# Patient Record
Sex: FEMALE | Race: WHITE | NOT HISPANIC OR LATINO | Employment: FULL TIME | URBAN - METROPOLITAN AREA
[De-identification: names, ages, dates, MRNs, and addresses within clinical notes are randomized per-mention and may not be internally consistent; named-entity substitution may affect disease eponyms.]

---

## 2020-03-02 ENCOUNTER — OFFICE VISIT (OUTPATIENT)
Dept: URGENT CARE | Facility: CLINIC | Age: 40
End: 2020-03-02
Payer: COMMERCIAL

## 2020-03-02 VITALS
DIASTOLIC BLOOD PRESSURE: 80 MMHG | OXYGEN SATURATION: 98 % | SYSTOLIC BLOOD PRESSURE: 125 MMHG | BODY MASS INDEX: 31.02 KG/M2 | HEIGHT: 66 IN | HEART RATE: 82 BPM | TEMPERATURE: 99.5 F | RESPIRATION RATE: 20 BRPM | WEIGHT: 193 LBS

## 2020-03-02 DIAGNOSIS — J02.9 SORE THROAT: Primary | ICD-10-CM

## 2020-03-02 LAB — S PYO AG THROAT QL: NEGATIVE

## 2020-03-02 PROCEDURE — 87880 STREP A ASSAY W/OPTIC: CPT | Performed by: FAMILY MEDICINE

## 2020-03-02 PROCEDURE — G0382 LEV 3 HOSP TYPE B ED VISIT: HCPCS | Performed by: FAMILY MEDICINE

## 2020-03-02 RX ORDER — CETIRIZINE HYDROCHLORIDE 10 MG/1
10 TABLET, CHEWABLE ORAL DAILY
COMMUNITY

## 2020-03-02 NOTE — LETTER
March 2, 2020     Patient: Avery Carbone   YOB: 1980   Date of Visit: 3/2/2020       To Whom it May Concern:    Avery Carbone was seen in my clinic on 3/2/2020  She is excused from work due to illness 3/3/2020  If you have any questions or concerns, please don't hesitate to call           Sincerely,          Calli Odell MD        CC: No Recipients

## 2020-03-03 NOTE — PROGRESS NOTES
NAME: Shaunna Lopez is a 42 y o  female  : 1980    MRN: [id number]      Assessment and Plan   Sore throat [J02 9]  1  Sore throat  POCT rapid strepA           Patient Instructions   Patient Instructions   F/u as needed  Most likely viral   Supportive care  OTC meds as needed  F/u with PCP if no improvement  Rapid strep neg  Proceed to ER if symptoms worsen  Chief Complaint     Chief Complaint   Patient presents with    Sore Throat     patient reports on  started with a cough,sore throat and low grade temp  History of Present Illness   Here c/o sore throat, fever, cough  Started yesterday  SOB today- used albuterol  Body aches yesterday  Took advil today  Review of Systems   Review of Systems   Constitutional: Positive for fatigue and fever  HENT: Positive for sore throat  Negative for ear pain and trouble swallowing  Eyes: Negative for visual disturbance  Respiratory: Positive for cough and shortness of breath  Negative for chest tightness and wheezing  Cardiovascular: Negative for chest pain  Gastrointestinal: Positive for nausea  Negative for abdominal pain, diarrhea and vomiting  Genitourinary: Negative for difficulty urinating and dysuria  Musculoskeletal: Positive for arthralgias and myalgias  Skin: Negative for rash and wound  Neurological: Positive for weakness  Negative for headaches  Current Medications       Current Outpatient Medications:     cetirizine (ZyrTEC) 10 MG chewable tablet, Chew 10 mg daily, Disp: , Rfl:     Current Allergies     Allergies as of 2020    (No Known Allergies)              Past Medical History:   Diagnosis Date    Allergic     Asthma        History reviewed  No pertinent surgical history  History reviewed  No pertinent family history  Medications have been verified      The following portions of the patient's history were reviewed and updated as appropriate: allergies, current medications, past family history, past medical history, past social history, past surgical history and problem list     Objective   /80   Pulse 82   Temp 99 5 °F (37 5 °C)   Resp 20   Ht 5' 6" (1 676 m)   Wt 87 5 kg (193 lb)   LMP 02/17/2020   SpO2 98%   BMI 31 15 kg/m²      Physical Exam     Physical Exam   Constitutional: She is oriented to person, place, and time  She appears well-developed and well-nourished  HENT:   Head: Normocephalic  Mouth/Throat: Posterior oropharyngeal erythema present  No oropharyngeal exudate or posterior oropharyngeal edema  Eyes: EOM are normal    Neck: Normal range of motion  Cardiovascular: Normal rate, regular rhythm and normal heart sounds  Exam reveals no gallop and no friction rub  No murmur heard  Pulmonary/Chest: Effort normal and breath sounds normal  No respiratory distress  She has no wheezes  She has no rales  Abdominal: Soft  Bowel sounds are normal  She exhibits no distension  There is no tenderness  There is no rebound and no guarding  Musculoskeletal: Normal range of motion  Neurological: She is oriented to person, place, and time  Skin: Skin is warm and dry  No rash noted  Psychiatric: She has a normal mood and affect  Thought content normal    Nursing note and vitals reviewed

## 2022-11-29 ENCOUNTER — OFFICE VISIT (OUTPATIENT)
Dept: URGENT CARE | Facility: CLINIC | Age: 42
End: 2022-11-29

## 2022-11-29 VITALS
SYSTOLIC BLOOD PRESSURE: 132 MMHG | HEIGHT: 67 IN | BODY MASS INDEX: 31.08 KG/M2 | WEIGHT: 198 LBS | RESPIRATION RATE: 16 BRPM | OXYGEN SATURATION: 100 % | TEMPERATURE: 98.1 F | HEART RATE: 91 BPM | DIASTOLIC BLOOD PRESSURE: 80 MMHG

## 2022-11-29 DIAGNOSIS — R52 BODY ACHES: ICD-10-CM

## 2022-11-29 DIAGNOSIS — B34.9 VIRAL SYNDROME: Primary | ICD-10-CM

## 2022-11-29 LAB
SARS-COV-2 AG UPPER RESP QL IA: NEGATIVE
VALID CONTROL: NORMAL

## 2022-11-29 RX ORDER — ALBUTEROL SULFATE 90 UG/1
2 AEROSOL, METERED RESPIRATORY (INHALATION) EVERY 6 HOURS PRN
COMMUNITY

## 2022-11-29 NOTE — LETTER
November 29, 2022     Patient: Dorie Mazariegos   YOB: 1980   Date of Visit: 11/29/2022       To Whom it May Concern:    Dorie Mazariegos was seen in my clinic on 11/29/2022  She may return to work on 12/01/2022  If you have any questions or concerns, please don't hesitate to call           Sincerely,          Rodrick Werner PA-C

## 2022-11-29 NOTE — PATIENT INSTRUCTIONS
Viral Syndrome   AMBULATORY CARE:   Viral syndrome  is a term used for symptoms of an infection caused by a virus  Viruses are spread easily from person to person through the air and on shared items  Signs and symptoms  may start slowly or suddenly and last hours to days  They can be mild to severe and can change over days or hours  You may have any of the following:  Fever and chills    A runny or stuffy nose    Cough, sore throat, or hoarseness    Headache, or pain and pressure around your eyes    Muscle aches and joint pain    Shortness of breath or wheezing    Abdominal pain, cramps, and diarrhea    Nausea, vomiting, or loss of appetite    Call your local emergency number (911 in the [address] [facility]) or have someone else call if:   You have a seizure  You cannot be woken  You have chest pain or trouble breathing  Seek care immediately if:   You have a stiff neck, a bad headache, and sensitivity to light  You feel weak, dizzy, or confused  You stop urinating or urinate a lot less than usual     You cough up blood or thick yellow or green mucus  You have severe abdominal pain or your abdomen is larger than usual     Call your doctor if:   Your symptoms do not get better with treatment or get worse after 3 days  You have a rash or ear pain  You have burning when you urinate  You have questions or concerns about your condition or care  Treatment for viral syndrome  may include medicines to manage your symptoms  Antibiotics are not given for a viral infection  You may  need any of the following:  Acetaminophen  decreases pain and fever  It is available without a doctor's order  Ask how much to take and how often to take it  Follow directions  Read the labels of all other medicines you are using to see if they also contain acetaminophen, or ask your doctor or pharmacist  Acetaminophen can cause liver damage if not taken correctly   Do not use more than 4 grams (4,000 milligrams) total of acetaminophen in one day  NSAIDs , such as ibuprofen, help decrease swelling, pain, and fever  NSAIDs can cause stomach bleeding or kidney problems in certain people  If you take blood thinner medicine, always ask your healthcare provider if NSAIDs are safe for you  Always read the medicine label and follow directions  Cold medicine  helps decrease swelling, control a cough, and relieve chest or nasal congestion  Saline nasal spray  helps decrease nasal congestion  Manage your symptoms:   Drink liquids as directed to prevent dehydration  Ask how much liquid to drink each day and which liquids are best for you  Ask if you should drink an oral rehydration solution (ORS)  An ORS has the right amounts of water, salts, and sugar you need to replace body fluids  This may help prevent dehydration caused by vomiting or diarrhea  Do not drink liquids with caffeine  Liquids with caffeine can make dehydration worse  Get plenty of rest to help your body heal   Take naps throughout the day  Ask your healthcare provider when you can return to work and your normal activities  Use a cool mist humidifier to help you breathe easier  Ask your healthcare provider how to use a cool mist humidifier  Eat honey or use cough drops for a sore throat  Cough drops are available without a doctor's order  Follow directions for taking cough drops  Do not smoke or be close to anyone who is smoking  Nicotine and other chemicals in cigarettes and cigars can cause lung damage  Smoking can also delay healing  Ask your healthcare provider for information if you currently smoke and need help to quit  E-cigarettes or smokeless tobacco still contain nicotine  Talk to your healthcare provider before you use these products  Prevent the spread of germs:       Wash your hands often  Wash your hands several times each day  Wash after you use the bathroom, change a child's diaper, and before you prepare or eat food   Use soap and water every time  Rub your soapy hands together, lacing your fingers  Wash the front and back of your hands, and in between your fingers  Use the fingers of one hand to scrub under the fingernails of the other hand  Wash for at least 20 seconds  Rinse with warm, running water for several seconds  Then dry your hands with a clean towel or paper towel  Use hand  that contains alcohol if soap and water are not available  Do not touch your eyes, nose, or mouth without washing your hands first          Cover a sneeze or cough  Use a tissue that covers your mouth and nose  Throw the tissue away in a trash can right away  Use the bend of your arm if a tissue is not available  Wash your hands well with soap and water or use a hand   Stay away from others while you are sick  Avoid crowds as much as possible  Ask about vaccines you may need  Talk to your healthcare provider about your vaccine history  He or she will tell you which vaccines you need, and when to get them  Get the influenza (flu) vaccine as soon as recommended each year  The flu vaccine is available starting in September or October  Flu viruses change, so it is important to get a flu vaccine every year  Get the pneumonia vaccine if recommended  This vaccine is usually recommended every 5 years  Your provider will tell you when to get this vaccine, if needed  Follow up with your doctor as directed:  Write down your questions so you remember to ask them during your visits  © Copyright [employer] 2022 Information is for End User's use only and may not be sold, redistributed or otherwise used for commercial purposes  All illustrations and images included in CareNotes® are the copyrighted property of [employer] M [employer] Inc  or Penny Quevedo  The above information is an  only  It is not intended as medical advice for individual conditions or treatments   Talk to your doctor, nurse or pharmacist before following any medical regimen to see if it is safe and effective for you

## 2022-11-29 NOTE — PROGRESS NOTES
[facility]        NAME: Munira Ritchie is a 38 y o  female  : 1980    MRN: [id number]  DATE: 2022  TIME: 9:32 AM    Assessment and Plan   Body aches [R52]  1  Body aches  Poct Covid 19 Rapid Antigen Test    Covid/Flu-Office Collect            Patient Instructions       Follow up with PCP in 3-5 days  Proceed to  ER if symptoms worsen  Chief Complaint     Chief Complaint   Patient presents with   • Fatigue     Patient reports onset of fatigue and body aches last night with sore throat and nasal congestion  Denies any fever  Denies testing at home for Covid  Managing symptoms with Advil last dose last night  History of Present Illness       HPI    Review of Systems   Review of Systems      Current Medications       Current Outpatient Medications:   •  albuterol (PROVENTIL HFA,VENTOLIN HFA) 90 mcg/act inhaler, Inhale 2 puffs every 6 (six) hours as needed for wheezing, Disp: , Rfl:   •  cetirizine (ZyrTEC) 10 MG chewable tablet, Chew 10 mg daily (Patient not taking: Reported on 2022), Disp: , Rfl:     Current Allergies     Allergies as of 2022   • (No Known Allergies)            The following portions of the patient's history were reviewed and updated as appropriate: allergies, current medications, past family history, past medical history, past social history, past surgical history and problem list      Past Medical History:   Diagnosis Date   • Allergic    • Asthma        No past surgical history on file  No family history on file  Medications have been verified  Objective   /80   Pulse 91   Temp 98 1 °F (36 7 °C)   Resp 16   Ht 5' 7" (1 702 m)   Wt 89 8 kg (198 lb)   SpO2 100%   BMI 31 01 kg/m²   No LMP recorded         Physical Exam     Physical Exam as appropriate: allergies, current medications, past family history, past medical history, past social history, past surgical history and problem list      Past Medical History:   Diagnosis Date   • Allergic    • Asthma        History reviewed  No pertinent surgical history  History reviewed  No pertinent family history  Medications have been verified  Objective   /80   Pulse 91   Temp 98 1 °F (36 7 °C)   Resp 16   Ht 5' 7" (1 702 m)   Wt 89 8 kg (198 lb)   SpO2 100%   BMI 31 01 kg/m²   No LMP recorded  Physical Exam     Physical Exam  Vitals and nursing note reviewed  Constitutional:       General: She is not in acute distress  Appearance: She is well-developed  She is ill-appearing  She is not diaphoretic  HENT:      Head: Normocephalic and atraumatic  Right Ear: Tympanic membrane normal       Left Ear: Tympanic membrane normal       Nose: Mucosal edema, congestion and rhinorrhea present  Mouth/Throat:      Pharynx: Uvula midline  Posterior oropharyngeal erythema (PND) present  Cardiovascular:      Rate and Rhythm: Normal rate and regular rhythm  Pulmonary:      Effort: Pulmonary effort is normal       Breath sounds: Normal breath sounds  Musculoskeletal:         General: Normal range of motion  Skin:     General: Skin is warm and dry  Findings: No rash  Neurological:      Mental Status: She is alert and oriented to person, place, and time

## 2022-11-30 LAB
FLUAV RNA RESP QL NAA+PROBE: NEGATIVE
FLUBV RNA RESP QL NAA+PROBE: NEGATIVE
SARS-COV-2 RNA RESP QL NAA+PROBE: NEGATIVE

## 2024-01-23 ENCOUNTER — APPOINTMENT (OUTPATIENT)
Dept: CT IMAGING | Facility: HOSPITAL | Age: 44
End: 2024-01-23
Payer: COMMERCIAL

## 2024-01-23 ENCOUNTER — HOSPITAL ENCOUNTER (EMERGENCY)
Facility: HOSPITAL | Age: 44
Discharge: HOME | End: 2024-01-23
Attending: EMERGENCY MEDICINE
Payer: COMMERCIAL

## 2024-01-23 VITALS
DIASTOLIC BLOOD PRESSURE: 72 MMHG | WEIGHT: 200 LBS | HEIGHT: 66 IN | BODY MASS INDEX: 32.14 KG/M2 | HEART RATE: 71 BPM | RESPIRATION RATE: 18 BRPM | SYSTOLIC BLOOD PRESSURE: 146 MMHG | OXYGEN SATURATION: 98 % | TEMPERATURE: 98.1 F

## 2024-01-23 DIAGNOSIS — N20.1 URETEROLITHIASIS: Primary | ICD-10-CM

## 2024-01-23 LAB
ALBUMIN SERPL BCP-MCNC: 4.5 G/DL (ref 3.5–5)
ALP SERPL-CCNC: 38 U/L (ref 34–104)
ALT SERPL W P-5'-P-CCNC: 10 U/L (ref 7–52)
ANION GAP SERPL CALCULATED.3IONS-SCNC: 8 MMOL/L
AST SERPL W P-5'-P-CCNC: 10 U/L (ref 13–39)
BACTERIA UR QL AUTO: ABNORMAL /HPF
BASOPHILS # BLD AUTO: 0.03 THOUSANDS/ÂΜL (ref 0–0.1)
BASOPHILS NFR BLD AUTO: 0 % (ref 0–1)
BILIRUB SERPL-MCNC: 0.42 MG/DL (ref 0.2–1)
BILIRUB UR QL STRIP: NEGATIVE
BUN SERPL-MCNC: 21 MG/DL (ref 5–25)
CALCIUM SERPL-MCNC: 9.4 MG/DL (ref 8.4–10.2)
CHLORIDE SERPL-SCNC: 104 MMOL/L (ref 96–108)
CLARITY UR: ABNORMAL
CO2 SERPL-SCNC: 25 MMOL/L (ref 21–32)
COLOR UR: YELLOW
CREAT SERPL-MCNC: 1.15 MG/DL (ref 0.6–1.3)
EOSINOPHIL # BLD AUTO: 0.27 THOUSAND/ÂΜL (ref 0–0.61)
EOSINOPHIL NFR BLD AUTO: 3 % (ref 0–6)
ERYTHROCYTE [DISTWIDTH] IN BLOOD BY AUTOMATED COUNT: 12 % (ref 11.6–15.1)
EXT PREGNANCY TEST URINE: NEGATIVE
EXT. CONTROL: NORMAL
GFR SERPL CREATININE-BSD FRML MDRD: 59 ML/MIN/1.73SQ M
GLUCOSE SERPL-MCNC: 98 MG/DL (ref 65–140)
GLUCOSE UR STRIP-MCNC: NEGATIVE MG/DL
HCT VFR BLD AUTO: 41.3 % (ref 34.8–46.1)
HGB BLD-MCNC: 13.6 G/DL (ref 11.5–15.4)
HGB UR QL STRIP.AUTO: ABNORMAL
IMM GRANULOCYTES # BLD AUTO: 0.05 THOUSAND/UL (ref 0–0.2)
IMM GRANULOCYTES NFR BLD AUTO: 1 % (ref 0–2)
KETONES UR STRIP-MCNC: NEGATIVE MG/DL
LEUKOCYTE ESTERASE UR QL STRIP: ABNORMAL
LIPASE SERPL-CCNC: 14 U/L (ref 11–82)
LYMPHOCYTES # BLD AUTO: 2.69 THOUSANDS/ÂΜL (ref 0.6–4.47)
LYMPHOCYTES NFR BLD AUTO: 28 % (ref 14–44)
MCH RBC QN AUTO: 30.6 PG (ref 26.8–34.3)
MCHC RBC AUTO-ENTMCNC: 32.9 G/DL (ref 31.4–37.4)
MCV RBC AUTO: 93 FL (ref 82–98)
MONOCYTES # BLD AUTO: 0.75 THOUSAND/ÂΜL (ref 0.17–1.22)
MONOCYTES NFR BLD AUTO: 8 % (ref 4–12)
NEUTROPHILS # BLD AUTO: 5.7 THOUSANDS/ÂΜL (ref 1.85–7.62)
NEUTS SEG NFR BLD AUTO: 60 % (ref 43–75)
NITRITE UR QL STRIP: NEGATIVE
NON-SQ EPI CELLS URNS QL MICRO: ABNORMAL /HPF
NRBC BLD AUTO-RTO: 0 /100 WBCS
PH UR STRIP.AUTO: 5.5 [PH]
PLATELET # BLD AUTO: 283 THOUSANDS/UL (ref 149–390)
PMV BLD AUTO: 9.9 FL (ref 8.9–12.7)
POTASSIUM SERPL-SCNC: 3.9 MMOL/L (ref 3.5–5.3)
PROT SERPL-MCNC: 7.4 G/DL (ref 6.4–8.4)
PROT UR STRIP-MCNC: ABNORMAL MG/DL
RBC # BLD AUTO: 4.44 MILLION/UL (ref 3.81–5.12)
RBC #/AREA URNS AUTO: ABNORMAL /HPF
SODIUM SERPL-SCNC: 137 MMOL/L (ref 135–147)
SP GR UR STRIP.AUTO: >=1.03 (ref 1–1.03)
UROBILINOGEN UR STRIP-ACNC: <2 MG/DL
WBC # BLD AUTO: 9.49 THOUSAND/UL (ref 4.31–10.16)
WBC #/AREA URNS AUTO: ABNORMAL /HPF

## 2024-01-23 PROCEDURE — 74176 CT ABD & PELVIS W/O CONTRAST: CPT

## 2024-01-23 PROCEDURE — G1004 CDSM NDSC: HCPCS

## 2024-01-23 PROCEDURE — 80053 COMPREHEN METABOLIC PANEL: CPT | Performed by: EMERGENCY MEDICINE

## 2024-01-23 PROCEDURE — 99284 EMERGENCY DEPT VISIT MOD MDM: CPT

## 2024-01-23 PROCEDURE — 81001 URINALYSIS AUTO W/SCOPE: CPT | Performed by: EMERGENCY MEDICINE

## 2024-01-23 PROCEDURE — 99284 EMERGENCY DEPT VISIT MOD MDM: CPT | Performed by: EMERGENCY MEDICINE

## 2024-01-23 PROCEDURE — 85025 COMPLETE CBC W/AUTO DIFF WBC: CPT | Performed by: EMERGENCY MEDICINE

## 2024-01-23 PROCEDURE — 36415 COLL VENOUS BLD VENIPUNCTURE: CPT

## 2024-01-23 PROCEDURE — 83690 ASSAY OF LIPASE: CPT | Performed by: EMERGENCY MEDICINE

## 2024-01-23 PROCEDURE — 81025 URINE PREGNANCY TEST: CPT | Performed by: EMERGENCY MEDICINE

## 2024-01-23 RX ORDER — ACETAMINOPHEN 325 MG/1
975 TABLET ORAL ONCE
Status: COMPLETED | OUTPATIENT
Start: 2024-01-23 | End: 2024-01-23

## 2024-01-23 RX ORDER — OXYCODONE HYDROCHLORIDE 5 MG/1
5 TABLET ORAL EVERY 4 HOURS PRN
Qty: 10 TABLET | Refills: 0 | Status: SHIPPED | OUTPATIENT
Start: 2024-01-23 | End: 2024-02-02

## 2024-01-23 RX ORDER — TAMSULOSIN HYDROCHLORIDE 0.4 MG/1
0.4 CAPSULE ORAL ONCE
Status: COMPLETED | OUTPATIENT
Start: 2024-01-24 | End: 2024-01-23

## 2024-01-23 RX ORDER — TAMSULOSIN HYDROCHLORIDE 0.4 MG/1
0.4 CAPSULE ORAL
Qty: 14 CAPSULE | Refills: 0 | Status: SHIPPED | OUTPATIENT
Start: 2024-01-23

## 2024-01-23 RX ORDER — ONDANSETRON 4 MG/1
4 TABLET, ORALLY DISINTEGRATING ORAL EVERY 6 HOURS PRN
Qty: 20 TABLET | Refills: 0 | Status: SHIPPED | OUTPATIENT
Start: 2024-01-23

## 2024-01-23 RX ADMIN — ACETAMINOPHEN 325MG 975 MG: 325 TABLET ORAL at 23:03

## 2024-01-23 RX ADMIN — TAMSULOSIN HYDROCHLORIDE 0.4 MG: 0.4 CAPSULE ORAL at 23:59

## 2024-01-24 NOTE — ED PROVIDER NOTES
History  Chief Complaint   Patient presents with    Flank Pain     Pt reports right sided flank pain that radiates downward that started yesterday. Denies problems with urination.     43-year-old female with history of asthma presents for evaluation of right-sided flank pain x 1 day associated nausea no vomiting, also had chills but no fevers, no frequency urgency or dysuria.  No chest pain or abdominal pain        Prior to Admission Medications   Prescriptions Last Dose Informant Patient Reported? Taking?   albuterol (PROVENTIL HFA,VENTOLIN HFA) 90 mcg/act inhaler  Self Yes No   Sig: Inhale 2 puffs every 6 (six) hours as needed for wheezing   cetirizine (ZyrTEC) 10 MG chewable tablet   Yes No   Sig: Chew 10 mg daily   Patient not taking: Reported on 2022      Facility-Administered Medications: None       Past Medical History:   Diagnosis Date    Allergic     Asthma     Endometriosis        Past Surgical History:   Procedure Laterality Date     SECTION      twice       History reviewed. No pertinent family history.  I have reviewed and agree with the history as documented.    E-Cigarette/Vaping    E-Cigarette Use Never User      E-Cigarette/Vaping Substances     Social History     Tobacco Use    Smoking status: Never    Smokeless tobacco: Never   Vaping Use    Vaping status: Never Used   Substance Use Topics    Alcohol use: Yes     Comment: social    Drug use: Never       Review of Systems   Constitutional:  Positive for chills. Negative for appetite change and fever.   HENT:  Negative for rhinorrhea and sore throat.    Eyes:  Negative for photophobia and visual disturbance.   Respiratory:  Negative for cough, chest tightness and wheezing.    Cardiovascular:  Negative for chest pain, palpitations and leg swelling.   Gastrointestinal:  Positive for nausea. Negative for abdominal distention, abdominal pain, blood in stool, constipation, diarrhea and vomiting.   Genitourinary:  Positive for flank pain.  Negative for dysuria, frequency, hematuria and urgency.   Musculoskeletal:  Negative for back pain.   Skin:  Negative for rash.   Neurological:  Negative for dizziness, weakness and headaches.   All other systems reviewed and are negative.      Physical Exam  Physical Exam  Vitals and nursing note reviewed.   Constitutional:       Appearance: She is well-developed.   HENT:      Head: Normocephalic and atraumatic.   Eyes:      Pupils: Pupils are equal, round, and reactive to light.   Cardiovascular:      Rate and Rhythm: Normal rate and regular rhythm.      Heart sounds: No murmur heard.     No friction rub. No gallop.   Pulmonary:      Effort: Pulmonary effort is normal.      Breath sounds: No wheezing or rales.   Chest:      Chest wall: No tenderness.   Abdominal:      General: There is no distension.      Palpations: Abdomen is soft. There is no mass.      Tenderness: There is no abdominal tenderness. There is right CVA tenderness. There is no guarding or rebound.   Musculoskeletal:      Cervical back: Normal range of motion and neck supple.   Skin:     General: Skin is warm and dry.   Neurological:      Mental Status: She is alert and oriented to person, place, and time.         Vital Signs  ED Triage Vitals [01/23/24 2035]   Temperature Pulse Respirations Blood Pressure SpO2   98.1 °F (36.7 °C) 79 18 151/97 98 %      Temp Source Heart Rate Source Patient Position - Orthostatic VS BP Location FiO2 (%)   Temporal Monitor -- Right arm --      Pain Score       10 - Worst Possible Pain           Vitals:    01/23/24 2035 01/23/24 2305   BP: 151/97 146/72   Pulse: 79 71         Visual Acuity      ED Medications  Medications   tamsulosin (FLOMAX) capsule 0.4 mg (has no administration in time range)   acetaminophen (TYLENOL) tablet 975 mg (975 mg Oral Given 1/23/24 2303)       Diagnostic Studies  Results Reviewed       Procedure Component Value Units Date/Time    Urine Microscopic [[id number]]  (Abnormal) Collected:  01/23/24 2039    Lab Status: Final result Specimen: Urine, Clean Catch Updated: 01/23/24 2153     RBC, UA 20-30 /hpf      WBC, UA 1-2 /hpf      Epithelial Cells Occasional /hpf      Bacteria, UA Occasional /hpf     Narrative:      Microscopic performed by Ryley Lewis.     Comprehensive metabolic panel [[id number]]  (Abnormal) Collected: 01/23/24 2039    Lab Status: Final result Specimen: Blood from Arm, Left Updated: 01/23/24 2135     Sodium 137 mmol/L      Potassium 3.9 mmol/L      Chloride 104 mmol/L      CO2 25 mmol/L      ANION GAP 8 mmol/L      BUN 21 mg/dL      Creatinine 1.15 mg/dL      Glucose 98 mg/dL      Calcium 9.4 mg/dL      AST 10 U/L      ALT 10 U/L      Alkaline Phosphatase 38 U/L      Total Protein 7.4 g/dL      Albumin 4.5 g/dL      Total Bilirubin 0.42 mg/dL      eGFR 59 ml/min/1.73sq m     Narrative:      National Kidney Disease Foundation guidelines for Chronic Kidney Disease (CKD):     Stage 1 with normal or high GFR (GFR > 90 mL/min/1.73 square meters)    Stage 2 Mild CKD (GFR = 60-89 mL/min/1.73 square meters)    Stage 3A Moderate CKD (GFR = 45-59 mL/min/1.73 square meters)    Stage 3B Moderate CKD (GFR = 30-44 mL/min/1.73 square meters)    Stage 4 Severe CKD (GFR = 15-29 mL/min/1.73 square meters)    Stage 5 End Stage CKD (GFR <15 mL/min/1.73 square meters)  Note: GFR calculation is accurate only with a steady state creatinine    Lipase [[id number]]  (Normal) Collected: 01/23/24 2039    Lab Status: Final result Specimen: Blood from Arm, Left Updated: 01/23/24 2135     Lipase 14 u/L     UA w Reflex to Microscopic w Reflex to Culture [[id number]]  (Abnormal) Collected: 01/23/24 2039    Lab Status: Final result Specimen: Urine, Clean Catch Updated: 01/23/24 2122     Color, UA Yellow     Clarity, UA Cloudy     Specific Gravity, UA >=1.030     pH, UA 5.5     Leukocytes, UA Moderate     Nitrite, UA Negative     Protein, UA Trace mg/dl      Glucose, UA Negative mg/dl      Ketones, UA Negative  mg/dl      Urobilinogen, UA <2.0 mg/dl      Bilirubin, UA Negative     Occult Blood, UA Large    CBC and differential [[id number]] Collected: 01/23/24 2039    Lab Status: Final result Specimen: Blood from Arm, Left Updated: 01/23/24 2053     WBC 9.49 Thousand/uL      RBC 4.44 Million/uL      Hemoglobin 13.6 g/dL      Hematocrit 41.3 %      MCV 93 fL      MCH 30.6 pg      MCHC 32.9 g/dL      RDW 12.0 %      MPV 9.9 fL      Platelets 283 Thousands/uL      nRBC 0 /100 WBCs      Neutrophils Relative 60 %      Immat GRANS % 1 %      Lymphocytes Relative 28 %      Monocytes Relative 8 %      Eosinophils Relative 3 %      Basophils Relative 0 %      Neutrophils Absolute 5.70 Thousands/µL      Immature Grans Absolute 0.05 Thousand/uL      Lymphocytes Absolute 2.69 Thousands/µL      Monocytes Absolute 0.75 Thousand/µL      Eosinophils Absolute 0.27 Thousand/µL      Basophils Absolute 0.03 Thousands/µL     POCT pregnancy, urine [[id number]]  (Normal) Resulted: 01/23/24 2040    Lab Status: Final result Specimen: Urine Updated: 01/23/24 2041     EXT Preg Test, Ur Negative     Control Valid                   CT renal stone study abdomen pelvis wo contrast   Final Result by Ishmael Atkins MD (01/23 2327)      Mild right hydroureteronephrosis and perinephric stranding/edema likely secondary to 2 mm suspected calculus in the region of the right ureterovesicular junction although the distal ureter is not well visualized. Additional 4 mm calculus seen at the    level of the right renal pelvis.      Left nephrolithiasis.         Workstation performed: [id number]                    Procedures  Procedures         ED Course  ED Course as of 01/23/24 2355   Tue Jan 23, 2024   2351 Stable for discharge at this time no indication for further patient evaluation or treatment                                             Medical Decision Making  43-year-old female with right-sided flank pain differential diagnosis includes musculoskeletal pain  urinary tract infection ureterolithiasis, pyelonephritis less likely intra-abdominal pathology such as cholecystitis as patient has normal abdominal exam lab work to evaluate for LFT abnormalities, kidney function abnormalities CT to further evaluate for acute pathology    Amount and/or Complexity of Data Reviewed  Labs: ordered.  Radiology: ordered.    Risk  OTC drugs.  Prescription drug management.             Disposition  Final diagnoses:   Ureterolithiasis     Time reflects when diagnosis was documented in both MDM as applicable and the Disposition within this note       Time User Action Codes Description Comment    1/23/2024 11:14 PM Karine Santana Add [N20.1] Ureterolithiasis           ED Disposition       ED Disposition   Discharge    Condition   Stable    Date/Time   Tue Jan 23, 2024 11:14 PM    Comment   Selina Gaston discharge to home/self care.                   Follow-up Information       Follow up With Specialties Details Why Contact Info Additional Information    Lesa Johnson MD Family Medicine Schedule an appointment as soon as possible for a visit   [address] [place] [id number]  [phone]        [facility] Emergency Department Emergency Medicine  If symptoms worsen [address] [facility] [address] [phone] [facility] Emergency Department, [address] [place], [place] [address]    [facility] [place] [facility] Schedule an appointment as soon as possible for a visit   [address] [place] [id number]  [phone] [facility] [place], [address] [place] [address], [address]   [phone]            Patient's Medications   Discharge Prescriptions    ONDANSETRON (ZOFRAN ODT) 4 MG DISINTEGRATING TABLET    Take 1 tablet (4 mg total) by mouth every 6 (six) hours as needed for nausea or vomiting       Start Date: 1/23/2024 End  Date: --       Order Dose: 4 mg       Quantity: 20 tablet    Refills: 0    OXYCODONE (ROXICODONE) 5 IMMEDIATE RELEASE TABLET    Take 1 tablet (5 mg total) by mouth every 4 (four) hours as needed for moderate pain for up to 10 days Max Daily Amount: 30 mg       Start Date: 1/23/2024 End Date: 2/2/2024       Order Dose: 5 mg       Quantity: 10 tablet    Refills: 0    TAMSULOSIN (FLOMAX) 0.4 MG    Take 1 capsule (0.4 mg total) by mouth daily with dinner       Start Date: 1/23/2024 End Date: --       Order Dose: 0.4 mg       Quantity: 14 capsule    Refills: 0       No discharge procedures on file.    PDMP Review       None            ED Provider  Electronically Signed by             Karine Santana DO  01/23/24 0904

## 2024-01-24 NOTE — DISCHARGE INSTRUCTIONS
Please follow-up with urology for further care.    Take Acetaminophen 2 extra strength tablets every 4-6 hours up to 3 times daily. Do not exceed 3 g in a 24-hour period   Take Ibuprofen 600 mg every 6-8 hours

## 2025-04-27 NOTE — PROGRESS NOTES
PT Evaluation     Today's date: 2025  Patient name: Selina Gaston  : 1980  MRN: [id number]  Referring provider: Deloris Razo MD  Dx:   Encounter Diagnosis     ICD-10-CM    1. Endometriosis  N80.9       2. Pelvic pain in female  R10.2       3. Adenomyosis  N80.03           Start Time: 1500  Stop Time: 1545  Total time in clinic (min): 45 minutes    Assessment  Impairments: abnormal muscle firing, abnormal muscle tone, abnormal or restricted ROM, activity intolerance, impaired physical strength, lacks appropriate home exercise program, pain with function, participation limitations, activity limitations and endurance    Assessment details: The pt is a 44 year old female who presents to skilled physical therapy services due to a decline in functional status related to chronic low back and pelvic pain associated with endometriosis and adenomyosis. Upon completion of the IE, the pt presents with impairments in pain, palpation tenderness, fascial restrictions, muscle tension, abdominal strength, LE strength, lumbar mobility/AROM, and flexibility. As a result of these impairments, the pt has difficulty with the following functional activities: ambulating, lifting/carrying, bladder emptying, bowel function (pain, incomplete bowel emptying), as well as activity capacity necessary for IADLs and recreational/work responsibilities. POC will include manual therapy for flexibility/mobility and symptom modulation, modalities (PRN), TE/TA/NR interventions for functional strength and neuromuscular control, HEP, and pt education. The pt will continue to benefit from skilled physical therapy services to address impairments in order to improve her quality of life and pelvic floor function.    Thank you for the referral.      Barriers to therapy: PMH includes: Asthma, Endometriosis,  section (2)    Barriers to intervention: medical complexity  Understanding of Dx/Px/POC: good     Prognosis:  good    Goals  STGs: Achieve within 6-8 weeks  - Pt reports improved fluid intake for reduced bladder urgency/frequency.  - Pt will be knowledgeable of postural strategies to optimize toileting techniques.  - Demonstrate appropriate breathing mechanics with pelvic floor relaxation and contraction for improved muscle coordination in 8 weeks.  - Pt will present with at least a 50% decrease discomfort with pelvic touch for improved tolerance to manual therapy and self-treatment techniques.    LTGs: Achieve by discharge  - Pt has implemented urge suppression strategies throughout the day and reports average voiding interval is 2-3 hours upon discharge in order to have more functional bladder functioning.  - Pt will have decreased lumbopelvic muscle overactivity to mild-trace to reflect a reduction in spasms in order to improve functional abilities.  - Pt will be independent with HEP, with proper demonstration and understanding for long-term management of functional abilities and quality of life.  - Pt will present with at least an 85% decrease discomfort with pelvic touch for improved tolerance to manual therapy and self-treatment techniques.                    Plan  Patient would benefit from: skilled physical therapy and women's health eval  Planned modality interventions: thermotherapy: hydrocollator packs    Planned therapy interventions: abdominal trunk stabilization, joint mobilization, manual therapy, breathing training, neuromuscular re-education, patient/caregiver education, postural training, self care, therapeutic activities, therapeutic exercise, therapeutic training, functional ROM exercises, home exercise program, body mechanics training, strengthening, stretching and flexibility    Frequency: 1x week  Plan of Care beginning date: 4/28/2025  Plan of Care expiration date: 7/4/2025  Treatment plan discussed with: patient    PT Pelvic Floor Subjective:   History of Present Illness:     The pt reports to  skilled physical therapy due to a decline in functional status related to low back/pelvic pain and difficulty with bladder/bowel emptying.      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  no hormone replacement therapy  Bladder Function:     Voiding Difficulties positive for: incomplete emptying       Voiding Difficulties comments:     Urinary leakage: no urine leakage    Nocturia (episodes per night): 1 and 0    Fluid Intake Type:  Water and coffee  Incontinence Management:     Pads/Diaper Use:  None  Bowel Function:     Voiding DIfficulties: painful defecating, unfinished feeling after defecating and constipation    Pain:     Current pain ratin    At best pain ratin    At worst pain rating:  10    Location:  Low back w/ occasional radiating symptoms down into B/L LE; Anterior pelvic pain B/L     Onset:  More than 2 years ago    Quality:  Dull ache, sharp and pressure    Aggravating factors:  Prolonged positions and bowel movements  Treatments:     Current treatment: physical therapy    Patient Goals:     Patient goals for therapy:  Improved comfort, fully empty bladder or bowels, decreased pain, improved bladder or bowel function, improved pain management, improved quality of life and return to sport/leisure activities      Objective     Static Posture     Shoulders  Rounded.    Palpation   Left   Tenderness of the adductor longus and adductor shawn.     Right   Tenderness of the adductor longus and adductor shawn.     Active Range of Motion     Lumbar   Flexion:  Restriction level: moderate  Extension:  with pain Restriction level: moderate  Left lateral flexion:  WFL  Right lateral flexion:  WFL and with pain  Left rotation:  Restriction level: minimal  Right rotation:  with pain Restriction level: moderate    Ambulation   Weight-Bearing Status   Weight-Bearing Status (Left): full weight bearing   Weight-Bearing Status (Right): full weight-bearing    Assistive device used: none      Abdominal Assessment:        Skin inspection:   scars present.   Number of scars: 4  Scar 1 location:  scar. Sensitivity level medium Restriction level high   Scar 2 location: RUQ.   Scar 3 location: LUQ.   Scar 4 location: RLQ/LLQ.       General Perineum Exam:     General perineum exam comments:   INITIAL EVALUATION:  Assessment of the perineum and internal pelvic floor muscles deferred to follow-up appointment due to time constraints with greater time focused on subjective report and pt education.      Visual Inspection of Perineum:   PFM Contraction Comments:   INITIAL EVALUATION:  Assessment of the perineum and internal pelvic floor muscles deferred to follow-up appointment due to time constraints with greater time focused on subjective report and pt education.      Pelvic Organ Prolapse   Comments:   INITIAL  "EVALUATION:  Assessment of the perineum and internal pelvic floor muscles deferred to follow-up appointment due to time constraints with greater time focused on subjective report and pt education.          Pelvic Floor Muscle Exam:             PERFECT Score        Perfect Score:   INITIAL EVALUATION:  Assessment of the perineum and internal pelvic floor muscles deferred to follow-up appointment due to time constraints with greater time focused on subjective report and pt education.        pelvic floor exam consent given by patient              Precautions: PMH includes: Asthma, Endometriosis,  section (2)                   Manuals             STM              Scar Mobilization                                       Neuro Re-Ed             Diaphragmatic Breathing 5x                                                                                          Ther Ex             Happy Baby Stretch: Supine 1x30\"            Figure Four Stretch 1x30\" ea LE            Butterfly Stretch: Supine                                                                 Pelvic Floor PT; POC KS            Anatomy as it relates to pt symptoms/presentation KS            Address pt questions PRN KS            HEP KS                                      Ther Activity                                       Gait Training                                       Modalities                                            "

## 2025-04-28 ENCOUNTER — EVALUATION (OUTPATIENT)
Dept: PHYSICAL THERAPY | Facility: CLINIC | Age: 45
End: 2025-04-28
Payer: COMMERCIAL

## 2025-04-28 DIAGNOSIS — R10.2 PELVIC PAIN IN FEMALE: ICD-10-CM

## 2025-04-28 DIAGNOSIS — N80.03 ADENOMYOSIS: ICD-10-CM

## 2025-04-28 DIAGNOSIS — N80.9 ENDOMETRIOSIS: Primary | ICD-10-CM

## 2025-04-28 PROCEDURE — 97162 PT EVAL MOD COMPLEX 30 MIN: CPT

## 2025-04-28 PROCEDURE — 97110 THERAPEUTIC EXERCISES: CPT

## 2025-04-28 NOTE — LETTER
2025    Deloirs Razo MD  [address] [address]    Patient: Selina Gaston   YOB: 1980   Date of Visit: 2025     Encounter Diagnosis     ICD-10-CM    1. Endometriosis  N80.9       2. Pelvic pain in female  R10.2       3. Adenomyosis  N80.03           Dear Dr. Deloris Razo MD:    Thank you for your recent referral of Selina Gaston. Please review the attached evaluation summary from Selina's recent visit.     Please verify that you agree with the plan of care by signing the attached order.     If you have any questions or concerns, please do not hesitate to call.     I sincerely appreciate the opportunity to share in the care of one of your patients and hope to have another opportunity to work with you in the near future.       Sincerely,    Jennifer De La Cruz, PT      Referring Provider:      I certify that I have read the below Plan of Care and certify the need for these services furnished under this plan of treatment while under my care.                    Deloris Razo MD  [address] [address]  Via Fax: [phone]          PT Evaluation     Today's date: 2025  Patient name: Selina Gaston  : 1980  MRN: [id number]  Referring provider: Deloris Razo MD  Dx:   Encounter Diagnosis     ICD-10-CM    1. Endometriosis  N80.9       2. Pelvic pain in female  R10.2       3. Adenomyosis  N80.03           Start Time: 1500  Stop Time: 1545  Total time in clinic (min): 45 minutes    Assessment  Impairments: abnormal muscle firing, abnormal muscle tone, abnormal or restricted ROM, activity intolerance, impaired physical strength, lacks appropriate home exercise program, pain with function, participation limitations, activity limitations and endurance    Assessment details: The pt is a 44 year old female who presents to skilled physical therapy services due to a decline in functional status  related to chronic low back and pelvic pain associated with endometriosis and adenomyosis. Upon completion of the IE, the pt presents with impairments in pain, palpation tenderness, fascial restrictions, muscle tension, abdominal strength, LE strength, lumbar mobility/AROM, and flexibility. As a result of these impairments, the pt has difficulty with the following functional activities: ambulating, lifting/carrying, bladder emptying, bowel function (pain, incomplete bowel emptying), as well as activity capacity necessary for IADLs and recreational/work responsibilities. POC will include manual therapy for flexibility/mobility and symptom modulation, modalities (PRN), TE/TA/NR interventions for functional strength and neuromuscular control, HEP, and pt education. The pt will continue to benefit from skilled physical therapy services to address impairments in order to improve her quality of life and pelvic floor function.    Thank you for the referral.      Barriers to therapy: PMH includes: Asthma, Endometriosis,  section (2)    Barriers to intervention: medical complexity  Understanding of Dx/Px/POC: good     Prognosis: good    Goals  STGs: Achieve within 6-8 weeks  - Pt reports improved fluid intake for reduced bladder urgency/frequency.  - Pt will be knowledgeable of postural strategies to optimize toileting techniques.  - Demonstrate appropriate breathing mechanics with pelvic floor relaxation and contraction for improved muscle coordination in 8 weeks.  - Pt will present with at least a 50% decrease discomfort with pelvic touch for improved tolerance to manual therapy and self-treatment techniques.    LTGs: Achieve by discharge  - Pt has implemented urge suppression strategies throughout the day and reports average voiding interval is 2-3 hours upon discharge in order to have more functional bladder functioning.  - Pt will have decreased lumbopelvic muscle overactivity to mild-trace to reflect a  reduction in spasms in order to improve functional abilities.  - Pt will be independent with HEP, with proper demonstration and understanding for long-term management of functional abilities and quality of life.  - Pt will present with at least an 85% decrease discomfort with pelvic touch for improved tolerance to manual therapy and self-treatment techniques.                    Plan  Patient would benefit from: skilled physical therapy and women's health eval  Planned modality interventions: thermotherapy: hydrocollator packs    Planned therapy interventions: abdominal trunk stabilization, joint mobilization, manual therapy, breathing training, neuromuscular re-education, patient/caregiver education, postural training, self care, therapeutic activities, therapeutic exercise, therapeutic training, functional ROM exercises, home exercise program, body mechanics training, strengthening, stretching and flexibility    Frequency: 1x week  Plan of Care beginning date: 2025  Plan of Care expiration date: 2025  Treatment plan discussed with: patient    PT Pelvic Floor Subjective:   History of Present Illness:     The pt reports to skilled physical therapy due to a decline in functional status related to low back/pelvic pain and difficulty with bladder/bowel emptying.      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  no hormone replacement therapy  Bladder Function:     Voiding Difficulties positive for: incomplete emptying       Voiding Difficulties comments:     Urinary leakage: no urine leakage    Nocturia (episodes per night): 1 and 0    Fluid Intake Type:  Water and coffee  Incontinence Management:     Pads/Diaper Use:  None  Bowel Function:     Voiding DIfficulties: painful defecating, unfinished feeling after defecating and constipation    Pain:     Current pain ratin    At best pain ratin    At worst pain rating:  10    Location:  Low back w/ occasional radiating symptoms down into B/L LE; Anterior pelvic pain B/L    Onset:  More than 2 years ago    Quality:  Dull ache, sharp and pressure    Aggravating factors:  Prolonged positions and bowel movements  Treatments:     Current treatment: physical therapy    Patient Goals:     Patient goals for therapy:  Improved comfort, fully empty bladder or bowels, decreased pain, improved bladder or bowel function, improved pain management, improved quality of life and return to sport/leisure activities      Objective     Static Posture     Shoulders  Rounded.    Palpation   Left   Tenderness of the adductor longus and adductor shawn.     Right   Tenderness of the adductor longus and adductor shawn.     Active Range of Motion     Lumbar   Flexion:  Restriction level: moderate  Extension:  with pain Restriction level: moderate  Left lateral flexion:   WFL  Right lateral flexion:  WFL and with pain  Left rotation:  Restriction level: minimal  Right rotation:  with pain Restriction level: moderate    Ambulation   Weight-Bearing Status   Weight-Bearing Status (Left): full weight bearing   Weight-Bearing Status (Right): full weight-bearing    Assistive device used: none      Abdominal Assessment:        Skin inspection:   scars present.   Number of scars: 4  Scar 1 location:  scar. Sensitivity level medium Restriction level high   Scar 2 location: RUQ.   Scar 3 location: LUQ.   Scar 4 location: RLQ/LLQ.       General Perineum Exam:     General perineum exam comments:   INITIAL EVALUATION:  Assessment of the perineum and internal pelvic floor muscles deferred to follow-up appointment due to time constraints with greater time focused on subjective report and pt education.      Visual Inspection of Perineum:   PFM Contraction Comments:   INITIAL EVALUATION:  Assessment of the perineum and internal pelvic floor muscles deferred to follow-up appointment due to time constraints with greater time focused on subjective report and pt education.      Pelvic Organ Prolapse   Comments:   INITIAL EVALUATION:  Assessment of the perineum and internal pelvic floor muscles deferred to follow-up appointment due to time constraints with greater time focused on subjective report and pt education.          Pelvic Floor Muscle Exam:             PERFECT Score        Perfect Score:   INITIAL EVALUATION:  Assessment of the perineum and internal pelvic floor muscles deferred to follow-up appointment due to time constraints with greater time focused on subjective report and pt education.        pelvic floor exam consent given by patient              Precautions: PMH includes: Asthma, Endometriosis,  section (2)                   Manuals             STM              Scar Mobilization                                       Neuro Re-Ed             Diaphragmatic  "Breathing 5x                                                                                          Ther Ex             Happy Baby Stretch: Supine 1x30\"            Figure Four Stretch 1x30\" ea LE            Butterfly Stretch: Supine                                                                 Pelvic Floor PT; POC KS            Anatomy as it relates to pt symptoms/presentation KS            Address pt questions PRN KS            HEP KS                                      Ther Activity                                       Gait Training                                       Modalities                                                             "

## 2025-04-29 NOTE — HOME EXERCISE EDUCATION
Program_ID:[id number]   Access Code: [email or web address]  URL: [email or web address]  Date: 04-  Prepared By: Jennifer De La Cruz    Program Notes      Exercises      - Supine Diaphragmatic Breathing - 3 x daily -  x weekly -  sets - 10 reps      - Supine Pelvic Floor Stretch - 3 x daily -  x weekly -  sets - 3 reps - 30 sec hold      - Supine Figure 4 Piriformis Stretch - 3 x daily -  x weekly -  sets - 3 reps - 30 sec hold

## 2025-05-04 NOTE — PROGRESS NOTES
"Daily Note     Today's date: 2025  Patient name: Selina Gaston  : 1980  MRN: [id number]  Referring provider: Deloirs Razo MD  Dx:   Encounter Diagnosis     ICD-10-CM    1. Endometriosis  N80.9       2. Pelvic pain in female  R10.2       3. Adenomyosis  N80.03           Start Time: 1333  Stop Time: 1415  Total time in clinic (min): 42 minutes    Subjective: \"A little dull pain\" over the weekend. Pt describes the happy baby stretch and states: \"I am so tight on that one.\" -- Occasional symptoms radiating from low back to the back of her hips. -- She did home exercise stretches twice a day.      Objective: See treatment diary below      Assessment: The pt participated in a skilled physical therapy session that focused on manual intervention and therapeutic exercise. The pt presented with muscle tension/palpation tenderness within the musculature of the posterior pelvis (e.g., gluteals, piriformis), which were addressed with manual intervention. This was followed by stretches to further aid in flexibility and muscle relaxation. She presented with less discomfort/muscle tension with happy baby stretch after manual intervention, which reflect its effectiveness. The pt presented with significant fascial/soft tissue restrictions along the  scar. It remains important to further address tissue tensions to decrease adhesions and improve tissue mobility. She tolerated treatment well and was provided with an updated HEP. The pt would benefit from continued PT to address impairments in order to improve her quality of life and pelvic floor function.       Plan: Continue per plan of care.      Precautions: PMH includes: Asthma, Endometriosis,  section (2)                  Manuals             STM  KS (posterior pelvis; lumbar)            Scar Mobilization  KS                                     Neuro Re-Ed               Diaphragmatic Breathing 5x                                    " "                                                      Ther Ex             Happy Baby Stretch: Supine 1x30\" 2x30\"            Figure Four Stretch 1x30\" ea LE 1x30\" ea LE           Butterfly Stretch: Supine  2x30\"           Piriformis Stretch: Seated  1x30\" ea LE           Happy Baby Stretch: Seated  1x30\"                                                               Pelvic Floor PT; POC KS            Anatomy as it relates to pt symptoms/presentation KS            Address pt questions PRN KS KS           HEP KS KS           Pt edu: Scar Massage  KS           Update on pt's symptoms/status  KS                        Ther Activity                                       Gait Training                                       Modalities                                            " Complex Repair And Melolabial Flap Text: The defect edges were debeveled with a #15 scalpel blade.  The primary defect was closed partially with a complex linear closure.  Given the location of the remaining defect, shape of the defect and the proximity to free margins a melolabial flap was deemed most appropriate for complete closure of the defect.  Using a sterile surgical marker, an appropriate advancement flap was drawn incorporating the defect and placing the expected incisions within the relaxed skin tension lines where possible.    The area thus outlined was incised deep to adipose tissue with a #15 scalpel blade.  The skin margins were undermined to an appropriate distance in all directions utilizing iris scissors.

## 2025-05-05 ENCOUNTER — OFFICE VISIT (OUTPATIENT)
Dept: PHYSICAL THERAPY | Facility: CLINIC | Age: 45
End: 2025-05-05
Payer: COMMERCIAL

## 2025-05-05 DIAGNOSIS — R10.2 PELVIC PAIN IN FEMALE: ICD-10-CM

## 2025-05-05 DIAGNOSIS — N80.03 ADENOMYOSIS: ICD-10-CM

## 2025-05-05 DIAGNOSIS — N80.9 ENDOMETRIOSIS: Primary | ICD-10-CM

## 2025-05-05 PROCEDURE — 97110 THERAPEUTIC EXERCISES: CPT

## 2025-05-05 PROCEDURE — 97140 MANUAL THERAPY 1/> REGIONS: CPT

## 2025-05-05 NOTE — HOME EXERCISE EDUCATION
Program_ID:[id number]   Access Code: [email or web address]  URL: [email or web address]  Date: 05-  Prepared By: Jennifer De La Cruz    Program Notes      Exercises      - Supine Diaphragmatic Breathing - 2-3 x daily -  x weekly -  sets - 10 reps      - Supine Pelvic Floor Stretch - 2-3 x daily -  x weekly -  sets - 3 reps - 30 sec hold      - Supine Figure 4 Piriformis Stretch - 2-3 x daily -  x weekly -  sets - 3 reps - 30 sec hold      - Supported Butterfly Stretch with Pelvic Floor Relaxation - 2-3 x daily -  x weekly -  sets - 2 reps - 30 sec hold      - Modified Dany Stretch - 2-3 x daily -  x weekly -  sets - 2 reps - 30 sec hold      - Seated Piriformis Stretch - 2-3 x daily -  x weekly -  sets - 3 reps - 30 sec hold      - Seated Happy Baby With Trunk Flexion For Pelvic Relaxation - 2-3 x daily -  x weekly -  sets - 2 reps - 30 sec hold

## 2025-05-11 NOTE — PROGRESS NOTES
"Daily Note     Today's date: 2025  Patient name: Selina Gaston  : 1980  MRN: [id number]  Referring provider: Deloris Razo MD  Dx:   Encounter Diagnosis     ICD-10-CM    1. Endometriosis  N80.9       2. Pelvic pain in female  R10.2       3. Adenomyosis  N80.03           Start Time: 1333  Stop Time: 1415  Total time in clinic (min): 42 minutes    Subjective: Pt reports that she has been doing pretty good. -- She notes an increase in pain yesterday. She attributes pain to gardening/weeding on Saturday \"for a couple hours, but it was broken up\". No pain during the activity. Symptoms worse in the morning and when trying to fall asleep at night. -- Pt reports constipation. She did not have a bowel movement yesterday, but had sensation to go. She experienced straining to have bowel movement this morning.      Objective: See treatment diary below      Assessment: The pt participated in a skilled physical therapy session that focused on manual intervention, neuromuscular re-education, and pt education. Scar mobilization and fascial mobilization were performed to address fascial/soft tissue restrictions along the  scar and lower abdomen. She presented with greater restrictions within tissues of the left side of the lower abdomen, which improved with manual intervention. Side-lying clamshells and reverse clamshells were added to address deficits in neuromuscular control/strength of proximal LE/pelvic musculature. Repetitions were limited to one set of ten for side-lying clamshells in response to progressive muscle fatigue. She tolerated treatment well and was provided with an updated HEP. The pt would benefit from continued PT to address impairments in order to improve her quality of life and pelvic floor function.       Plan: Continue per plan of care.      Precautions: PMH includes: Asthma, Endometriosis,  section (2)                 Manuals             STM  KS (posterior " "pelvis; lumbar) KS (posterior pelvis)           Scar Mobilization  KS KS          Fascial Mobilization   KS                                    Neuro Re-Ed               Diaphragmatic Breathing 5x            Side-lying Clamshell: Hips Neutral   10x ea LE          Side-lying Reverse Clamshell: Hips Neutral   10x ea LE          Side-lying reverse clamshell   2x10 ea LE                                                 Ther Ex             Happy Baby Stretch: Supine 1x30\" 2x30\"            Figure Four Stretch 1x30\" ea LE 1x30\" ea LE           Butterfly Stretch: Supine  2x30\"           Piriformis Stretch: Seated  1x30\" ea LE           Happy Baby Stretch: Seated  1x30\"           Dany Stretch  30\" ea LE                                                  Pelvic Floor PT; POC KS            Anatomy as it relates to pt symptoms/presentation KS  KS          Address pt questions PRN KS KS KS          HEP KS KS KS          Pt edu: Scar Massage  KS KS          Update on pt's symptoms/status  KS KS                       Ther Activity                                       Gait Training                                       Modalities                                              "

## 2025-05-12 ENCOUNTER — OFFICE VISIT (OUTPATIENT)
Dept: PHYSICAL THERAPY | Facility: CLINIC | Age: 45
End: 2025-05-12
Payer: COMMERCIAL

## 2025-05-12 DIAGNOSIS — N80.9 ENDOMETRIOSIS: Primary | ICD-10-CM

## 2025-05-12 DIAGNOSIS — N80.03 ADENOMYOSIS: ICD-10-CM

## 2025-05-12 DIAGNOSIS — R10.2 PELVIC PAIN IN FEMALE: ICD-10-CM

## 2025-05-12 PROCEDURE — 97110 THERAPEUTIC EXERCISES: CPT

## 2025-05-12 PROCEDURE — 97140 MANUAL THERAPY 1/> REGIONS: CPT

## 2025-05-12 PROCEDURE — 97112 NEUROMUSCULAR REEDUCATION: CPT

## 2025-05-12 NOTE — HOME EXERCISE EDUCATION
Program_ID:[id number]   Access Code: [email or web address]  URL: [email or web address]  Date: 05-  Prepared By: Jennifer De La Cruz    Program Notes      Exercises      - Supine Diaphragmatic Breathing - 2-3 x daily -  x weekly -  sets - 10 reps      - Supine Pelvic Floor Stretch - 2-3 x daily -  x weekly -  sets - 3 reps - 30 sec hold      - Supine Figure 4 Piriformis Stretch - 2-3 x daily -  x weekly -  sets - 3 reps - 30 sec hold      - Supported Butterfly Stretch with Pelvic Floor Relaxation - 2-3 x daily -  x weekly -  sets - 2 reps - 30 sec hold      - Modified Dany Stretch - 2-3 x daily -  x weekly -  sets - 2 reps - 30 sec hold      - Seated Piriformis Stretch - 2-3 x daily -  x weekly -  sets - 3 reps - 30 sec hold      - Seated Happy Baby With Trunk Flexion For Pelvic Relaxation - 2-3 x daily -  x weekly -  sets - 2 reps - 30 sec hold      - Sidelying Clamshell in Neutral - 1 x daily -  x weekly - 2 sets - 10 reps      - Sidelying Reverse Clamshell - 1 x daily -  x weekly - 2 sets - 10 reps

## 2025-05-17 NOTE — PROGRESS NOTES
"Daily Note     Today's date: 2025  Patient name: Selina Gaston  : 1980  MRN: [id number]  Referring provider: Deloris Razo MD  Dx:   Encounter Diagnosis     ICD-10-CM    1. Endometriosis  N80.9       2. Pelvic pain in female  R10.2       3. Adenomyosis  N80.03           Start Time: 1331  Stop Time: 1415  Total time in clinic (min): 44 minutes    Subjective: Regarding pain, pt reports: \"I feel like this is the worst week I've had.\" -- Pt explains that she felt good on Monday and Tuesday last week with an increase in pain on Wednesday and Thursday. She felt a bit better on Friday as well as Saturday and . She notes an increase in pain this morning. Pt felt better after doing her stretches this morning and went for a walk after stretching.      Objective: See treatment diary below      Assessment: The pt participated in a skilled physical therapy session that focused on manual intervention and pt education. The pt provided verbal consent for assessment of the perineum and internal pelvic floor muscles. Light touch sensation along the inner thighs bilaterally as well as regions anterior/posterior to the perineum were intact. Cephalad movement of pelvic floor muscles and 4/5 MMT with contraction. Tension within the pelvic floor muscles noted at baseline and pt had difficulty with pelvic floor muscle lengthening/bearing down. Palpation tenderness with insertion to the vagina, which progressively decreased the light touch. Greater muscle tension noted along the left side of the internal pelvic floor muscles with occasional radiating symptoms into the left anterior pelvis/LLQ. The pt also presented with significant muscle tension within the hip adductors bilaterally, which was addressed with manual intervention. Less pain/muscle tension and greater willingness to move noted with butterfly stretch after manual intervention. She tolerated treatment well. The pt would benefit from continued PT to " "address impairments in order to improve her quality of life and pelvic floor function.       Plan: Continue per plan of care.      Precautions: PMH includes: Asthma, Endometriosis,  section (2)                Manuals             STM  KS (posterior pelvis; lumbar) KS (posterior pelvis) KS (L posterior pelvis, B/L hip adductors)          Scar Mobilization  KS KS          Fascial Mobilization   KS          Assessment/STM to Internal PFM (vaginal)    KS                      Neuro Re-Ed               Diaphragmatic Breathing 5x            Side-lying Clamshell: Hips Neutral   10x ea LE          Side-lying Reverse Clamshell: Hips Neutral   10x ea LE          Side-lying reverse clamshell   2x10 ea LE                                                 Ther Ex             Happy Baby Stretch: Supine 1x30\" 2x30\"            Figure Four Stretch 1x30\" ea LE 1x30\" ea LE           Butterfly Stretch: Supine  2x30\"  2x30\"         Piriformis Stretch: Seated  1x30\" ea LE           Happy Baby Stretch: Seated  1x30\"           Dany Stretch  30\" ea LE                                                  Pelvic Floor PT; POC KS   KS         Anatomy as it relates to pt symptoms/presentation KS  KS KS         Address pt questions PRN KS KS KS KS         HEP KS KS KS KS         Pt edu: Scar Massage  KS KS          Update on pt's symptoms/status  KS KS KS                      Ther Activity                                       Gait Training                                       Modalities                                                "

## 2025-05-19 ENCOUNTER — OFFICE VISIT (OUTPATIENT)
Dept: PHYSICAL THERAPY | Facility: CLINIC | Age: 45
End: 2025-05-19
Payer: COMMERCIAL

## 2025-05-19 DIAGNOSIS — R10.2 PELVIC PAIN IN FEMALE: ICD-10-CM

## 2025-05-19 DIAGNOSIS — N80.03 ADENOMYOSIS: ICD-10-CM

## 2025-05-19 DIAGNOSIS — N80.9 ENDOMETRIOSIS: Primary | ICD-10-CM

## 2025-05-19 PROCEDURE — 97110 THERAPEUTIC EXERCISES: CPT

## 2025-05-19 PROCEDURE — 97140 MANUAL THERAPY 1/> REGIONS: CPT

## 2025-05-26 ENCOUNTER — APPOINTMENT (OUTPATIENT)
Dept: PHYSICAL THERAPY | Facility: CLINIC | Age: 45
End: 2025-05-26
Payer: COMMERCIAL

## 2025-06-01 NOTE — PROGRESS NOTES
PT Progress Report    Today's date: 2025  Patient name: Selina Gaston  : 1980  MRN: [id number]  Referring provider: Deloris Razo MD  Dx:   Encounter Diagnosis     ICD-10-CM    1. Endometriosis  N80.9       2. Pelvic pain in female  R10.2       3. Adenomyosis  N80.03             Start Time: 1330  Stop Time: 1415  Total time in clinic (min): 45 minutes    Assessment  Impairments: abnormal muscle firing, abnormal muscle tone, abnormal or restricted ROM, activity intolerance, impaired physical strength, lacks appropriate home exercise program, pain with function, participation limitations, activity limitations and endurance    Assessment details: The pt is a 44 year old female who presents to skilled physical therapy services due to a decline in functional status related to chronic low back and pelvic pain associated with endometriosis and adenomyosis. At this point in her POC, she has received 5 skilled therapy sessions. Upon completion of the CO, she presents with improvements in symptom severity/frequency as noted by subjective report (e.g., NPRS). An 8 point decrease in her PFDI 20 score (IE: 10; CO: 2) further reflects progress in pelvic floor function since the start of her POC. She continues to present with impairments in pain, palpation tenderness, fascial restrictions, muscle tension, abdominal strength, LE strength, lumbar mobility/AROM, and flexibility. As a result of these impairments, the pt has difficulty with the following functional activities: ambulating, lifting/carrying, bladder emptying, bowel function (pain, incomplete bowel emptying), as well as activity capacity necessary for IADLs and recreational/work responsibilities. POC will include manual therapy for flexibility/mobility and symptom modulation, modalities (PRN), TE/TA/NR interventions for functional strength and neuromuscular control, HEP, and pt education. The pt will continue to benefit from skilled physical therapy  services to address impairments in order to improve her quality of life and pelvic floor function.    Thank you for the referral.    Barriers to therapy: PMH includes: Asthma, Endometriosis,  section (2)    Barriers to intervention: medical complexity  Understanding of Dx/Px/POC: good     Prognosis: good    Goals  STGs: Achieve within 6-8 weeks  - Pt reports improved fluid intake for reduced bladder urgency/frequency.  (MET)  - Pt will be knowledgeable of postural strategies to optimize toileting techniques.   (MET)  - Demonstrate appropriate breathing mechanics with pelvic floor relaxation and contraction for improved muscle coordination in 8 weeks.     (PROGRESSING)  - Pt will present with at least a 50% decrease discomfort with pelvic touch for improved tolerance to manual therapy and self-treatment techniques.    (PROGRESSING)    LTGs: Achieve by discharge  - Pt has implemented urge suppression strategies throughout the day and reports average voiding interval is 2-3 hours upon discharge in order to have more functional bladder functioning.   (PROGRESSING)  - Pt will have decreased lumbopelvic muscle overactivity to mild-trace to reflect a reduction in spasms in order to improve functional abilities.     (PROGRESSING)  - Pt will be independent with HEP, with proper demonstration and understanding for long-term management of functional abilities and quality of life.    (PROGRESSING)  - Pt will present with at least an 85% decrease discomfort with pelvic touch for improved tolerance to manual therapy and self-treatment techniques.     (PROGRESSING)      Plan  Patient would benefit from: skilled physical therapy  Planned modality interventions: thermotherapy: hydrocollator packs    Planned therapy interventions: abdominal trunk stabilization, joint mobilization, manual therapy, breathing training, neuromuscular re-education, patient/caregiver education, postural training, self care, therapeutic activities,  "therapeutic exercise, therapeutic training, functional ROM exercises, home exercise program, body mechanics training, strengthening, stretching and flexibility    Frequency: 1x week  Plan of Care beginning date: 2025  Plan of Care expiration date: 2025  Treatment plan discussed with: patient    PT Pelvic Floor Subjective:   History of Present Illness:     PROGRESS REPORT: 2025  Pt reports that symptoms of pelvic pain have been \"50/50\" with \"some good days and some bad days.\" \"Bad days\" are not as severe as previously. -- Pt denies urinary leakage. Pt denies constipation of difficulty with bowel function.        INITIAL EVALUATION:  The pt reports to skilled physical therapy due to a decline in functional status related to low back/pelvic pain and difficulty with bladder/bowel emptying.      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    INITIAL EVALUATION:  Currently tolerates body weight strengthening; low back/pelvic pain with use of additional weights    Not exercising due to: pain  OB/ gyn History    Gestational History:     Prior " Pregnancy: Yes      Number of prior pregnancies: 3    Number of term pregnancies: 2    Delivery Type:  section      Number of vaginal deliveries after caesarian: 2    Menstrual History:      Menopause: Hysterectomy.  no hormone replacement therapy  Bladder Function:     Voiding Difficulties positive for: incomplete emptying       Voiding Difficulties comments:     Urinary leakage: no urine leakage    Nocturia (episodes per night): 1 and 0    Fluid Intake Type:  Water and coffee  Incontinence Management:     Pads/Diaper Use:  None  Pain:     Current pain ratin    At best pain ratin    At worst pain ratin (IE: 10/10)    Location:  Low back w/ occasional radiating symptoms down into B/L LE; Anterior pelvic pain B/L    Onset:  More than 2 years ago    Quality:  Dull ache, sharp and pressure    Aggravating factors:  Prolonged positions  Treatments:     Current treatment: physical therapy    Patient Goals:     Patient goals for therapy:  Improved comfort, fully empty bladder or bowels, decreased pain, improved bladder or bowel function, improved pain management, improved quality of life and return to sport/leisure activities      Objective     Static Posture     Shoulders  Rounded.    Palpation   Left   Tenderness of the adductor longus, adductor shawn, erector spinae, gluteus medius and lumbar paraspinals.     Right   Tenderness of the adductor longus, adductor shawn, erector spinae, gluteus medius, lumbar paraspinals and transverse abdominus.     Tenderness     Lumbar Spine  Tenderness in the right transverse process.     Active Range of Motion     Lumbar   Flexion:  Restriction level: moderate  Extension:  Restriction level: minimal  Left lateral flexion:  WFL  Right lateral flexion: Active right lumbar lateral flexion: Right side LBP.  WFL and with pain  Left rotation:  Restriction level: minimal  Right rotation:  Restriction level: moderate    Strength/Myotome Testing     Left Hip   Planes of  "Motion   Flexion: 4  External rotation: 4+  Internal rotation: 4    Right Hip   Planes of Motion   Flexion: 4  External rotation: 4+  Internal rotation: 4    Ambulation   Weight-Bearing Status   Weight-Bearing Status (Left): full weight bearing   Weight-Bearing Status (Right): full weight-bearing    Assistive device used: none      Abdominal Assessment:        Skin inspection:   scars present.   Number of scars: 4  Scar 1 location:  scar. Sensitivity level medium Restriction level high   Scar 2 location: RUQ.   Scar 3 location: LUQ.   Scar 4 location: RLQ/LLQ.       General Perineum Exam:     General perineum exam comments:   2025: NOTE  \"Light touch sensation along the inner thighs bilaterally as well as regions anterior/posterior to the perineum were intact. Cephalad movement of pelvic floor muscles and 4/5 MMT with contraction. Tension within the pelvic floor muscles noted at baseline and pt had difficulty with pelvic floor muscle lengthening/bearing down. Palpation tenderness with insertion to the vagina, which progressively decreased the light touch. Greater muscle tension noted along the left side of the internal pelvic floor muscles with occasional radiating symptoms into the left anterior pelvis/LLQ. The pt also presented with significant muscle tension within the hip adductors bilaterally\"    INITIAL EVALUATION:  Assessment of the perineum and internal pelvic floor muscles deferred to follow-up appointment due to time constraints with greater time focused on subjective report and pt education.      Visual Inspection of Perineum:   Excursion of perineal body in cephalad direction with contraction of pelvic floor muscles (PFM): good  PFM Contraction Comments:   2025: NOTE  \"Light touch sensation along the inner thighs bilaterally as well as regions anterior/posterior to the perineum were intact. Cephalad movement of pelvic floor muscles and 4/5 MMT with contraction. Tension within the pelvic " "floor muscles noted at baseline and pt had difficulty with pelvic floor muscle lengthening/bearing down. Palpation tenderness with insertion to the vagina, which progressively decreased the light touch. Greater muscle tension noted along the left side of the internal pelvic floor muscles with occasional radiating symptoms into the left anterior pelvis/LLQ. The pt also presented with significant muscle tension within the hip adductors bilaterally\"    INITIAL EVALUATION:  Assessment of the perineum and internal pelvic floor muscles deferred to follow-up appointment due to time constraints with greater time focused on subjective report and pt education.    Sensation: intact  Tenderness: provoked    Pelvic Organ Prolapse   Comments:   5/19/2025: NOTE  \"Light touch sensation along the inner thighs bilaterally as well as regions anterior/posterior to the perineum were intact. Cephalad movement of pelvic floor muscles and 4/5 MMT with contraction. Tension within the pelvic floor muscles noted at baseline and pt had difficulty with pelvic floor muscle lengthening/bearing down. Palpation tenderness with insertion to the vagina, which progressively decreased the light touch. Greater muscle tension noted along the left side of the internal pelvic floor muscles with occasional radiating symptoms into the left anterior pelvis/LLQ. The pt also presented with significant muscle tension within the hip adductors bilaterally\"    INITIAL EVALUATION:  Assessment of the perineum and internal pelvic floor muscles deferred to follow-up appointment due to time constraints with greater time focused on subjective report and pt education.          Pelvic Floor Muscle Exam:       Pelvic floor muscle relaxation is delayed and incomplete.         PERFECT Score   Power right: 4/5   Power left: 4/5          pelvic floor exam consent given by patient    Pelvic exam completed: vaginally            Precautions: PMH includes: Asthma, Endometriosis, " " section (2)               Manuals             STM  KS (posterior pelvis; lumbar) KS (posterior pelvis) KS (L posterior pelvis, B/L hip adductors) KS (posterior pelvis, hip add, lumbar) - B/L in s/l         Scar Mobilization  KS KS          Fascial Mobilization   KS  KS (lower abdomen, anterior pelvis)        Bladder Mobilization     KS        Assessment/STM to Internal PFM (vaginal)    KS         QL Stretch: Manual     KS - pt s/l                     Neuro Re-Ed               Diaphragmatic Breathing 5x            Side-lying Clamshell: Hips Neutral   10x ea LE          Side-lying Reverse Clamshell: Hips Neutral   10x ea LE          Side-lying reverse clamshell   2x10 ea LE                                                 Ther Ex             Happy Baby Stretch: Supine 1x30\" 2x30\"            Figure Four Stretch 1x30\" ea LE 1x30\" ea LE           Butterfly Stretch: Supine  2x30\"  2x30\"         Piriformis Stretch: Seated  1x30\" ea LE           Happy Baby Stretch: Seated  1x30\"           Dany Stretch  30\" ea LE           LTR     10x w/ 5\" hold each dir        Open book     10x e/ 3\" hold ea dir                     Pelvic Floor PT; POC KS   KS         Anatomy as it relates to pt symptoms/presentation KS  KS KS KS        Address pt questions PRN KS KS KS KS KS        HEP KS KS KS KS KS        Pt edu: Scar Massage  KS KS          Update on pt's symptoms/status  KS KS KS KS        LA: Objective Measures                          Ther Activity             PFDI 20     KS        Vulvar Pain Questionnaire     KS        LA: Update on pt's symptoms/status/function     KS                     Gait Training                                       Modalities                                            "

## 2025-06-02 ENCOUNTER — OFFICE VISIT (OUTPATIENT)
Dept: PHYSICAL THERAPY | Facility: CLINIC | Age: 45
End: 2025-06-02
Payer: COMMERCIAL

## 2025-06-02 DIAGNOSIS — R10.2 PELVIC PAIN IN FEMALE: ICD-10-CM

## 2025-06-02 DIAGNOSIS — N80.03 ADENOMYOSIS: ICD-10-CM

## 2025-06-02 DIAGNOSIS — N80.9 ENDOMETRIOSIS: Primary | ICD-10-CM

## 2025-06-02 PROCEDURE — 97110 THERAPEUTIC EXERCISES: CPT

## 2025-06-02 PROCEDURE — 97140 MANUAL THERAPY 1/> REGIONS: CPT

## 2025-06-02 PROCEDURE — 97530 THERAPEUTIC ACTIVITIES: CPT

## 2025-06-08 NOTE — PROGRESS NOTES
"Daily Note     Today's date: 2025  Patient name: Selina Gaston  : 1980  MRN: [id number]  Referring provider: Deloris Razo MD  Dx:   Encounter Diagnosis     ICD-10-CM    1. Endometriosis  N80.9       2. Pelvic pain in female  R10.2       3. Adenomyosis  N80.03           Start Time: 1330  Stop Time: 1415  Total time in clinic (min): 45 minutes    Subjective: \"I've had a good week.\" -- Pt reports that she is doing a blood test for food intolerances. She did not experience an increase/worsening of symptoms despite eating foods that she had been avoiding. -- Mild ache in anterior pelvis for 2 nights after previous appointment. Pt placed her hands on her lower abdomen/pelvis (discussed at prior PT session) and she fell asleep well.      Objective: See treatment diary below      Assessment: The pt participated in a skilled physical therapy session that focused on manual intervention, neuromuscular re-education, and therapeutic exercise. Soft tissue/fascial mobilization was performed long the lower abdomen/anterior pelvis to address restrictions associated with the  scar and multiple abdominal/pelvic surgeries. The pt presented with less tenderness with manual intervention, which reflects improvement in symptom management and good carryover between treatment sessions. Pt was educated on acupressure points to aid in symptom management and relaxation/release of pelvic tension. She tolerated treatment well. The pt would benefit from continued PT to address impairments in order to improve her quality of life and pelvic floor function.       Plan: Continue per plan of care.      Precautions: PMH includes: Asthma, Endometriosis,  section (2)        5       Manuals             STM  KS (posterior pelvis; lumbar) KS (posterior pelvis) KS (L posterior pelvis, B/L hip adductors) KS (posterior pelvis, hip add, lumbar) - B/L in s/l KS (R posterior pelvis, lumbar) - L s/l     " "   Scar Mobilization  KS KS   KS       Fascial Mobilization   KS  KS (lower abdomen, anterior pelvis) KS (lower abdomen, anterior pelvis)       Bladder Mobilization     KS KS       Assessment/STM to Internal PFM (vaginal)    KS         QL Stretch: Manual     KS - pt s/l KS - pt L s/l                    Neuro Re-Ed               Diaphragmatic Breathing 5x            Side-lying Clamshell: Hips Neutral   10x ea LE          Side-lying Reverse Clamshell: Hips Neutral   10x ea LE          Side-lying reverse clamshell   2x10 ea LE          Acupressure points: CV 2- CV 6      10 breaths - 2 bouts       Acupressure points: CV 2, K 11      10 breaths                                 Ther Ex               Happy Baby Stretch: Supine 1x30\" 2x30\"            Figure Four Stretch 1x30\" ea LE 1x30\" ea LE           Butterfly Stretch: Supine  2x30\"  2x30\"         Piriformis Stretch: Seated  1x30\" ea LE           Happy Baby Stretch: Seated  1x30\"           Dany Stretch  30\" ea LE           LTR     10x w/ 5\" hold each dir        Open book     10x e/ 3\" hold ea dir          Yin Yoga: [facility] Wiper      5x w/ 5\" hold ea dir                    Pelvic Floor PT; POC KS   KS         Anatomy as it relates to pt symptoms/presentation KS  KS KS KS KS       Address pt questions PRN KS KS KS KS KS KS       HEP KS KS KS KS KS KS       Pt edu: Scar Massage  KS KS   KS         Update on pt's symptoms/status  KS KS KS KS KS       AR: Objective Measures                          Ther Activity             PFDI 20     KS        Vulvar Pain Questionnaire     KS        AR: Update on pt's symptoms/status/function     KS                     Gait Training                                       Modalities                                            "

## 2025-06-09 ENCOUNTER — OFFICE VISIT (OUTPATIENT)
Dept: PHYSICAL THERAPY | Facility: CLINIC | Age: 45
End: 2025-06-09
Payer: COMMERCIAL

## 2025-06-09 DIAGNOSIS — N80.03 ADENOMYOSIS: ICD-10-CM

## 2025-06-09 DIAGNOSIS — N80.9 ENDOMETRIOSIS: Primary | ICD-10-CM

## 2025-06-09 DIAGNOSIS — R10.2 PELVIC PAIN IN FEMALE: ICD-10-CM

## 2025-06-09 PROCEDURE — 97110 THERAPEUTIC EXERCISES: CPT

## 2025-06-09 PROCEDURE — 97140 MANUAL THERAPY 1/> REGIONS: CPT

## 2025-06-15 NOTE — PROGRESS NOTES
"Daily Note     Today's date: 2025  Patient name: Selina Gaston  : 1980  MRN: [id number]  Referring provider: Deloris Razo MD  Dx:   Encounter Diagnosis     ICD-10-CM    1. Endometriosis  N80.9       2. Pelvic pain in female  R10.2       3. Adenomyosis  N80.03           Start Time: 1330  Stop Time: 1415  Total time in clinic (min): 45 minutes    Subjective: Pt reports that she has been feeling good this past week, \"except yesterday.\" Pt associates symptoms to GI and explains that she ate deli meat yesterday, which provoked symptoms previously. Symptoms of lower abdominal cramping and looser stool. Blood test for food intolerance tomorrow. -- Pt took an exercise class on Thursday last week. She did not have pain, but felt sore in her legs.       Objective: See treatment diary below      Assessment: The pt participated in a skilled physical therapy session that focused on manual intervention, neuromuscular re-education, and therapeutic exercise. The pt presented with soft tissue/fascial restrictions along the  scar and lower abdomen, which were addressed with manual intervention. Mild anterior pelvic pain (left side > right side) provoked with contraction of the transverse abdominis muscles. Symptom severity reduced with softer muscle contraction. Mild pelvic pain returned with isometric hip adduction. Acupressure points CV2 - CV 6 with breathing was effective at modulating symptoms. She tolerated treatment well. The pt would benefit from continued PT to address impairments in order to improve her quality of life and pelvic floor function.       Plan: Continue per plan of care.      Precautions: PMH includes: Asthma, Endometriosis,  section (2)        6      Manuals             STM  KS (posterior pelvis; lumbar) KS (posterior pelvis) KS (L posterior pelvis, B/L hip adductors) KS (posterior pelvis, hip add, lumbar) - B/L in s/l KS (R posterior pelvis, " "lumbar) - L s/l KS (R posterior pelvis, lumbar) - L s/l       Scar Mobilization  KS KS   KS KS      Fascial Mobilization   KS  KS (lower abdomen, anterior pelvis) KS (lower abdomen, anterior pelvis)       Bladder Mobilization     KS KS KS      Assessment/STM to Internal PFM (vaginal)    KS         QL Stretch: Manual     KS - pt s/l KS - pt L s/l                    Neuro Re-Ed               Diaphragmatic Breathing 5x            Side-lying Clamshell: Hips Neutral   10x ea LE          Side-lying Reverse Clamshell: Hips Neutral   10x ea LE          Side-lying reverse clamshell   2x10 ea LE          Acupressure points: CV 2- CV 6      10 breaths - 2 bouts 10 breaths      Acupressure points: CV 2, K 11      10 breaths       TA Brace       10x (w/ exhale )      Gluteal Set       10x w/ 5\" hold      Iso Hip Add: Hook-lying       10x w/ 5\" hold                                Ther Ex               Happy Baby Stretch: Supine 1x30\" 2x30\"            Figure Four Stretch 1x30\" ea LE 1x30\" ea LE           Butterfly Stretch: Supine  2x30\"  2x30\"         Piriformis Stretch: Seated  1x30\" ea LE           Happy Baby Stretch: Seated  1x30\"           Dany Stretch  30\" ea LE           LTR     10x w/ 5\" hold each dir        Open book     10x e/ 3\" hold ea dir          Yin Yoga: [facility] Wiper      5x w/ 5\" hold ea dir                    Pelvic Floor PT; POC KS   KS         Anatomy as it relates to pt symptoms/presentation KS  KS KS KS KS KS      Address pt questions PRN KS KS KS KS KS KS KS      HEP KS KS KS KS KS KS KS      Pt edu: Scar Massage  KS KS   KS       Update on pt's symptoms/status  KS KS KS KS KS KS      ND: Objective Measures                          Ther Activity             PFDI 20     KS        Vulvar Pain Questionnaire     KS        ND: Update on pt's symptoms/status/function     KS                     Gait Training                                       Modalities                                              "

## 2025-06-16 ENCOUNTER — OFFICE VISIT (OUTPATIENT)
Dept: PHYSICAL THERAPY | Facility: CLINIC | Age: 45
End: 2025-06-16
Payer: COMMERCIAL

## 2025-06-16 DIAGNOSIS — N80.03 ADENOMYOSIS: ICD-10-CM

## 2025-06-16 DIAGNOSIS — N80.9 ENDOMETRIOSIS: Primary | ICD-10-CM

## 2025-06-16 DIAGNOSIS — R10.2 PELVIC PAIN IN FEMALE: ICD-10-CM

## 2025-06-16 PROCEDURE — 97110 THERAPEUTIC EXERCISES: CPT

## 2025-06-16 PROCEDURE — 97140 MANUAL THERAPY 1/> REGIONS: CPT

## 2025-06-16 PROCEDURE — 97112 NEUROMUSCULAR REEDUCATION: CPT

## 2025-06-16 NOTE — HOME EXERCISE EDUCATION
Program_ID:[id number]   Access Code: [email or web address]  URL: [email or web address]  Date: 06-  Prepared By: Jennifer De La Cruz    Program Notes      Exercises      - Supine Diaphragmatic Breathing - 2-3 x daily -  x weekly -  sets - 10 reps      - Supine Pelvic Floor Stretch - 2-3 x daily -  x weekly -  sets - 3 reps - 30 sec hold      - Supine Figure 4 Piriformis Stretch - 2-3 x daily -  x weekly -  sets - 3 reps - 30 sec hold      - Supported Butterfly Stretch with Pelvic Floor Relaxation - 2-3 x daily -  x weekly -  sets - 2 reps - 30 sec hold      - Modified Dany Stretch - 2-3 x daily -  x weekly -  sets - 2 reps - 30 sec hold      - Seated Piriformis Stretch - 2-3 x daily -  x weekly -  sets - 3 reps - 30 sec hold      - Seated Happy Baby With Trunk Flexion For Pelvic Relaxation - 2-3 x daily -  x weekly -  sets - 2 reps - 30 sec hold      - Sidelying Clamshell in Neutral - 1 x daily -  x weekly - 2 sets - 10 reps      - Sidelying Reverse Clamshell - 1 x daily -  x weekly - 2 sets - 10 reps      - Hooklying Transversus Abdominis Palpation - 2 x daily -  x weekly -  sets - 10 reps      - Hooklying Gluteal Sets - 2 x daily -  x weekly -  sets - 10 reps - 5 sec hold      - Supine Hip Adduction Isometric with Ball - 1 x daily -  x weekly -  sets - 10 reps - 3 sec hold

## 2025-06-22 NOTE — PROGRESS NOTES
"Daily Note     Today's date: 2025  Patient name: Selina Gaston  : 1980  MRN: [id number]  Referring provider: Deloris Razo MD  Dx:   Encounter Diagnosis     ICD-10-CM    1. Endometriosis  N80.9       2. Pelvic pain in female  R10.2       3. Adenomyosis  N80.03           Start Time: 1331  Stop Time: 1415  Total time in clinic (min): 44 minutes    Subjective: Pt reports that she has some pelvic pain today. She ate foods that she had not had in awhile (cupcakes with gluten, fried chicken). She had symptoms of bloating yesterday, although no pelvic pain. She has some pelvic pain today on the right side. \"Pain is not crippling today.\" She used heat earlier today to help manage symptoms. -- \"The rest of the week was good.\" No pain with exercise class.       Objective: See treatment diary below      Assessment: The pt participated in a skilled physical therapy session that focused on manual intervention, neuromuscular re-education, and therapeutic exercise. Side-lying clamshells and reverse clamshells were re-introduced and bent knee fallout was added to improve neuromuscular control/strength of musculature of the posterior pelvis necessary for pelvic stability. The pt performed TA activation and isometric hip adduction without an onset of pelvic pain, which reflects improvement in neuromuscular control and contractile tolerance of deep core musculature. She tolerated treatment well and was provided with an updated HEP. The pt would benefit from continued PT to address impairments in order to improve her quality of life and pelvic floor function.       Plan: Continue per plan of care.      Precautions: PMH includes: Asthma, Endometriosis,  section (2)        6     Manuals             STM  KS (posterior pelvis; lumbar) KS (posterior pelvis) KS (L posterior pelvis, B/L hip adductors) KS (posterior pelvis, hip add, lumbar) - B/L in s/l KS (R posterior pelvis, " "lumbar) - L s/l KS (R posterior pelvis, lumbar) - L s/l KS (R posterior pelvis, lumbar) - L s/l      Scar Mobilization  KS KS   KS KS KS     Fascial Mobilization   KS  KS (lower abdomen, anterior pelvis) KS (lower abdomen, anterior pelvis)       Bladder Mobilization     KS KS KS      Assessment/STM to Internal PFM (vaginal)    KS         QL Stretch: Manual     KS - pt s/l KS - pt L s/l                    Neuro Re-Ed               Diaphragmatic Breathing 5x            Side-lying Clamshell: Hips Neutral   10x ea LE          Side-lying Reverse Clamshell: Hips Neutral   10x ea LE          Side-lying reverse clamshell   2x10 ea LE     2x10 ea LE     Side-lying clamshell        2x10 ea LE     Acupressure points: CV 2- CV 6      10 breaths - 2 bouts 10 breaths      Acupressure points: CV 2, K 11      10 breaths       TA Brace       10x (w/ exhale ) 10x (w/ exhale )     Gluteal Set       10x w/ 5\" hold      Iso Hip Add: Hook-lying       10x w/ 5\" hold 10x w/ 5\" hold     TA brace + Bent Knee Fallout        Green TB 10x ea LE                               Ther Ex               Happy Baby Stretch: Supine 1x30\" 2x30\"            Figure Four Stretch 1x30\" ea LE 1x30\" ea LE           Butterfly Stretch: Supine  2x30\"  2x30\"    1x30\"     Piriformis Stretch: Seated  1x30\" ea LE           Happy Baby Stretch: Seated  1x30\"           Dany Stretch  30\" ea LE           LTR     10x w/ 5\" hold each dir        Open book     10x e/ 3\" hold ea dir          Yin Yoga: [facility] Wiper      5x w/ 5\" hold ea dir       Psoas Stretch off edge of table        2x30\" ea LE                  Pelvic Floor PT; POC KS   KS         Anatomy as it relates to pt symptoms/presentation KS  KS KS KS KS KS      Address pt questions PRN KS KS KS KS KS KS KS KS     HEP KS KS KS KS KS KS KS KS     Pt edu: Scar Massage  KS KS   KS       Update on pt's symptoms/status  KS KS KS KS KS KS KS     NH: Objective Measures                          Ther Activity       "       PFDI 20     KS        Vulvar Pain Questionnaire     KS        MS: Update on pt's symptoms/status/function     KS                     Gait Training                                       Modalities

## 2025-06-23 ENCOUNTER — OFFICE VISIT (OUTPATIENT)
Dept: PHYSICAL THERAPY | Facility: CLINIC | Age: 45
End: 2025-06-23
Payer: COMMERCIAL

## 2025-06-23 DIAGNOSIS — R10.2 PELVIC PAIN IN FEMALE: ICD-10-CM

## 2025-06-23 DIAGNOSIS — N80.9 ENDOMETRIOSIS: Primary | ICD-10-CM

## 2025-06-23 DIAGNOSIS — N80.03 ADENOMYOSIS: ICD-10-CM

## 2025-06-23 PROCEDURE — 97140 MANUAL THERAPY 1/> REGIONS: CPT

## 2025-06-23 PROCEDURE — 97110 THERAPEUTIC EXERCISES: CPT

## 2025-06-23 PROCEDURE — 97112 NEUROMUSCULAR REEDUCATION: CPT

## 2025-06-23 NOTE — HOME EXERCISE EDUCATION
Program_ID:[id number]   Access Code: [email or web address]  URL: [email or web address]  Date: 06-  Prepared By: Jennifer De La Cruz    Program Notes      Exercises      - Supine Diaphragmatic Breathing - 2-3 x daily -  x weekly -  sets - 10 reps      - Supine Pelvic Floor Stretch - 2-3 x daily -  x weekly -  sets - 3 reps - 30 sec hold      - Supine Figure 4 Piriformis Stretch - 2-3 x daily -  x weekly -  sets - 3 reps - 30 sec hold      - Supported Butterfly Stretch with Pelvic Floor Relaxation - 2-3 x daily -  x weekly -  sets - 2 reps - 30 sec hold      - Modified Dany Stretch - 2-3 x daily -  x weekly -  sets - 2 reps - 30 sec hold      - Seated Piriformis Stretch - 2-3 x daily -  x weekly -  sets - 3 reps - 30 sec hold      - Seated Happy Baby With Trunk Flexion For Pelvic Relaxation - 2-3 x daily -  x weekly -  sets - 2 reps - 30 sec hold      - Sidelying Clamshell in Neutral - 1 x daily -  x weekly - 2 sets - 10 reps      - Sidelying Reverse Clamshell - 1 x daily -  x weekly - 2 sets - 10 reps      - Hooklying Transversus Abdominis Palpation - 2 x daily -  x weekly -  sets - 10 reps      - Hooklying Gluteal Sets - 2 x daily -  x weekly -  sets - 10 reps - 5 sec hold      - Supine Hip Adduction Isometric with Ball - 1 x daily -  x weekly -  sets - 10 reps - 3 sec hold      - Hooklying Single Leg Bent Knee Fallouts with Resistance -  x daily - 4 x weekly - 2 sets - 10 reps

## 2025-06-30 ENCOUNTER — APPOINTMENT (OUTPATIENT)
Dept: PHYSICAL THERAPY | Facility: CLINIC | Age: 45
End: 2025-06-30
Payer: COMMERCIAL

## 2025-07-08 ENCOUNTER — OFFICE VISIT (OUTPATIENT)
Dept: PHYSICAL THERAPY | Facility: CLINIC | Age: 45
End: 2025-07-08
Payer: COMMERCIAL

## 2025-07-08 DIAGNOSIS — N80.9 ENDOMETRIOSIS: Primary | ICD-10-CM

## 2025-07-08 DIAGNOSIS — R10.2 PELVIC PAIN IN FEMALE: ICD-10-CM

## 2025-07-08 PROCEDURE — 97530 THERAPEUTIC ACTIVITIES: CPT

## 2025-07-08 NOTE — PROGRESS NOTES
Daily Note     Today's date: 2025  Patient name: Selina Gaston  : 1980  MRN: [id number]  Referring provider: Deloris Razo MD  Dx:   Encounter Diagnosis     ICD-10-CM    1. Endometriosis  N80.9       2. Pelvic pain in female  R10.2                      Subjective: Pt reports wanting to return to the gym but wants to ensure safety and good form with TA activation with TE.       Objective: See treatment diary below      Assessment: Todays session focused on form with strength training. Correcting technique as well as alternative techniques to avoid pain. Full list of TE listed below: Pt reports no pain during or post tx session.      Plan: Continue per plan of care.       25 TE:  Straight bar deadlifts   Rev Lunges x10 ea  Squats x10  DB thrusters x10  Straight bar hang clean+ press x10  DB snatch x10 ea  KB snatch x10 ea  Lat Lunge x10 ea  S/L deadlift x10  DB OH press x10  DB front raises x10 ea  DB lat raises x10 ea  Planks  Side planks       Precautions: PMH includes: Asthma, Endometriosis,  section (2)        6     Manuals             STM  KS (posterior pelvis; lumbar) KS (posterior pelvis) KS (L posterior pelvis, B/L hip adductors) KS (posterior pelvis, hip add, lumbar) - B/L in s/l KS (R posterior pelvis, lumbar) - L s/l KS (R posterior pelvis, lumbar) - L s/l KS (R posterior pelvis, lumbar) - L s/l      Scar Mobilization  KS KS   KS KS KS     Fascial Mobilization   KS  KS (lower abdomen, anterior pelvis) KS (lower abdomen, anterior pelvis)       Bladder Mobilization     KS KS KS      Assessment/STM to Internal PFM (vaginal)    KS         QL Stretch: Manual     KS - pt s/l KS - pt L s/l                    Neuro Re-Ed               Diaphragmatic Breathing 5x            Side-lying Clamshell: Hips Neutral   10x ea LE          Side-lying Reverse Clamshell: Hips Neutral   10x ea LE          Side-lying reverse clamshell   2x10 ea LE     2x10 ea  "LE     Side-lying clamshell        2x10 ea LE     Acupressure points: CV 2- CV 6      10 breaths - 2 bouts 10 breaths      Acupressure points: CV 2, K 11      10 breaths       TA Brace       10x (w/ exhale ) 10x (w/ exhale )     Gluteal Set       10x w/ 5\" hold      Iso Hip Add: Hook-lying       10x w/ 5\" hold 10x w/ 5\" hold     TA brace + Bent Knee Fallout        Green TB 10x ea LE                               Ther Ex               Happy Baby Stretch: Supine 1x30\" 2x30\"            Figure Four Stretch 1x30\" ea LE 1x30\" ea LE           Butterfly Stretch: Supine  2x30\"  2x30\"    1x30\"     Piriformis Stretch: Seated  1x30\" ea LE           Happy Baby Stretch: Seated  1x30\"           Dany Stretch  30\" ea LE           LTR     10x w/ 5\" hold each dir        Open book     10x e/ 3\" hold ea dir          Yin Yoga: Windshield Wiper      5x w/ 5\" hold ea dir       Psoas Stretch off edge of table        2x30\" ea LE                  Pelvic Floor PT; POC KS   KS         Anatomy as it relates to pt symptoms/presentation KS  KS KS KS KS KS      Address pt questions PRN KS KS KS KS KS KS KS KS     HEP KS KS KS KS KS KS KS KS     Pt edu: Scar Massage  KS KS   KS       Update on pt's symptoms/status  KS KS KS KS KS KS KS     AL: Objective Measures                          Ther Activity             PFDI 20     KS        Vulvar Pain Questionnaire     KS        AL: Update on pt's symptoms/status/function     KS                     Gait Training                                       Modalities                                                  "

## 2025-07-15 ENCOUNTER — APPOINTMENT (OUTPATIENT)
Dept: PHYSICAL THERAPY | Facility: CLINIC | Age: 45
End: 2025-07-15
Payer: COMMERCIAL

## 2025-07-22 ENCOUNTER — APPOINTMENT (OUTPATIENT)
Dept: PHYSICAL THERAPY | Facility: CLINIC | Age: 45
End: 2025-07-22
Payer: COMMERCIAL

## 2025-07-29 ENCOUNTER — APPOINTMENT (OUTPATIENT)
Dept: PHYSICAL THERAPY | Facility: CLINIC | Age: 45
End: 2025-07-29
Payer: COMMERCIAL